# Patient Record
Sex: MALE | URBAN - METROPOLITAN AREA
[De-identification: names, ages, dates, MRNs, and addresses within clinical notes are randomized per-mention and may not be internally consistent; named-entity substitution may affect disease eponyms.]

---

## 2017-04-17 ENCOUNTER — APPOINTMENT (RX ONLY)
Dept: URBAN - METROPOLITAN AREA CLINIC 23 | Facility: CLINIC | Age: 62
Setting detail: DERMATOLOGY
End: 2017-04-17

## 2017-04-17 DIAGNOSIS — D18.0 HEMANGIOMA: ICD-10-CM

## 2017-04-17 DIAGNOSIS — L81.4 OTHER MELANIN HYPERPIGMENTATION: ICD-10-CM

## 2017-04-17 DIAGNOSIS — D22 MELANOCYTIC NEVI: ICD-10-CM

## 2017-04-17 DIAGNOSIS — L82.1 OTHER SEBORRHEIC KERATOSIS: ICD-10-CM

## 2017-04-17 DIAGNOSIS — D485 NEOPLASM OF UNCERTAIN BEHAVIOR OF SKIN: ICD-10-CM

## 2017-04-17 PROBLEM — D48.5 NEOPLASM OF UNCERTAIN BEHAVIOR OF SKIN: Status: ACTIVE | Noted: 2017-04-17

## 2017-04-17 PROBLEM — D22.5 MELANOCYTIC NEVI OF TRUNK: Status: ACTIVE | Noted: 2017-04-17

## 2017-04-17 PROBLEM — D18.01 HEMANGIOMA OF SKIN AND SUBCUTANEOUS TISSUE: Status: ACTIVE | Noted: 2017-04-17

## 2017-04-17 PROBLEM — M12.9 ARTHROPATHY, UNSPECIFIED: Status: ACTIVE | Noted: 2017-04-17

## 2017-04-17 PROBLEM — J30.1 ALLERGIC RHINITIS DUE TO POLLEN: Status: ACTIVE | Noted: 2017-04-17

## 2017-04-17 PROCEDURE — ? OTHER

## 2017-04-17 PROCEDURE — ? COUNSELING

## 2017-04-17 PROCEDURE — 11100: CPT

## 2017-04-17 PROCEDURE — ? BIOPSY BY SHAVE METHOD

## 2017-04-17 PROCEDURE — A4550 SURGICAL TRAYS: HCPCS

## 2017-04-17 PROCEDURE — 99213 OFFICE O/P EST LOW 20 MIN: CPT | Mod: 25

## 2017-04-17 ASSESSMENT — LOCATION SIMPLE DESCRIPTION DERM
LOCATION SIMPLE: CHEST
LOCATION SIMPLE: LEFT UPPER BACK
LOCATION SIMPLE: RIGHT UPPER BACK
LOCATION SIMPLE: LEFT LOWER BACK
LOCATION SIMPLE: ABDOMEN
LOCATION SIMPLE: RIGHT LOWER BACK

## 2017-04-17 ASSESSMENT — LOCATION DETAILED DESCRIPTION DERM
LOCATION DETAILED: RIGHT SUPERIOR MEDIAL UPPER BACK
LOCATION DETAILED: LEFT INFERIOR UPPER BACK
LOCATION DETAILED: RIGHT INFERIOR MEDIAL MIDBACK
LOCATION DETAILED: RIGHT INFERIOR UPPER BACK
LOCATION DETAILED: EPIGASTRIC SKIN
LOCATION DETAILED: RIGHT SUPERIOR MEDIAL MIDBACK
LOCATION DETAILED: LEFT MEDIAL INFERIOR CHEST
LOCATION DETAILED: LEFT SUPERIOR LATERAL MIDBACK
LOCATION DETAILED: LEFT SUPERIOR LATERAL UPPER BACK
LOCATION DETAILED: RIGHT MID-UPPER BACK
LOCATION DETAILED: LEFT SUPERIOR MEDIAL MIDBACK
LOCATION DETAILED: LEFT MID-UPPER BACK
LOCATION DETAILED: LEFT INFERIOR MEDIAL MIDBACK
LOCATION DETAILED: LEFT INFERIOR LATERAL MIDBACK
LOCATION DETAILED: LEFT INFERIOR MEDIAL UPPER BACK

## 2017-04-17 ASSESSMENT — LOCATION ZONE DERM: LOCATION ZONE: TRUNK

## 2017-08-29 PROBLEM — Z01.810 PREOP CARDIOVASCULAR EXAM: Status: ACTIVE | Noted: 2017-08-29

## 2017-10-16 ENCOUNTER — APPOINTMENT (RX ONLY)
Dept: URBAN - METROPOLITAN AREA CLINIC 23 | Facility: CLINIC | Age: 62
Setting detail: DERMATOLOGY
End: 2017-10-16

## 2017-10-16 DIAGNOSIS — L82.0 INFLAMED SEBORRHEIC KERATOSIS: ICD-10-CM

## 2017-10-16 DIAGNOSIS — L82.1 OTHER SEBORRHEIC KERATOSIS: ICD-10-CM

## 2017-10-16 DIAGNOSIS — L85.3 XEROSIS CUTIS: ICD-10-CM

## 2017-10-16 DIAGNOSIS — L81.4 OTHER MELANIN HYPERPIGMENTATION: ICD-10-CM

## 2017-10-16 DIAGNOSIS — D22 MELANOCYTIC NEVI: ICD-10-CM

## 2017-10-16 PROBLEM — L57.0 ACTINIC KERATOSIS: Status: ACTIVE | Noted: 2017-10-16

## 2017-10-16 PROBLEM — D22.5 MELANOCYTIC NEVI OF TRUNK: Status: ACTIVE | Noted: 2017-10-16

## 2017-10-16 PROCEDURE — A4550 SURGICAL TRAYS: HCPCS

## 2017-10-16 PROCEDURE — ? COUNSELING

## 2017-10-16 PROCEDURE — ? LIQUID NITROGEN

## 2017-10-16 PROCEDURE — 17110 DESTRUCTION B9 LES UP TO 14: CPT

## 2017-10-16 PROCEDURE — ? BIOPSY BY SHAVE METHOD

## 2017-10-16 PROCEDURE — 99213 OFFICE O/P EST LOW 20 MIN: CPT | Mod: 25

## 2017-10-16 PROCEDURE — ? OTHER

## 2017-10-16 PROCEDURE — 11100: CPT | Mod: 59

## 2017-10-16 PROCEDURE — ? TREATMENT REGIMEN

## 2017-10-16 ASSESSMENT — LOCATION SIMPLE DESCRIPTION DERM
LOCATION SIMPLE: LEFT ANTERIOR NECK
LOCATION SIMPLE: LEFT PRETIBIAL REGION
LOCATION SIMPLE: RIGHT ANTERIOR NECK
LOCATION SIMPLE: CHEST
LOCATION SIMPLE: LEFT CLAVICULAR SKIN
LOCATION SIMPLE: RIGHT UPPER BACK

## 2017-10-16 ASSESSMENT — LOCATION DETAILED DESCRIPTION DERM
LOCATION DETAILED: LEFT LATERAL SUPERIOR CHEST
LOCATION DETAILED: RIGHT SUPERIOR ANTERIOR NECK
LOCATION DETAILED: LEFT CLAVICULAR SKIN
LOCATION DETAILED: LEFT MEDIAL DISTAL PRETIBIAL REGION
LOCATION DETAILED: LEFT MEDIAL SUPERIOR CHEST
LOCATION DETAILED: RIGHT CLAVICULAR NECK
LOCATION DETAILED: LEFT CLAVICULAR NECK
LOCATION DETAILED: RIGHT SUPERIOR MEDIAL UPPER BACK
LOCATION DETAILED: RIGHT INFERIOR ANTERIOR NECK
LOCATION DETAILED: LEFT INFERIOR ANTERIOR NECK

## 2017-10-16 ASSESSMENT — LOCATION ZONE DERM
LOCATION ZONE: TRUNK
LOCATION ZONE: LEG
LOCATION ZONE: NECK

## 2018-03-16 ENCOUNTER — APPOINTMENT (RX ONLY)
Dept: URBAN - METROPOLITAN AREA CLINIC 24 | Facility: CLINIC | Age: 63
Setting detail: DERMATOLOGY
End: 2018-03-16

## 2018-03-16 DIAGNOSIS — L259 CONTACT DERMATITIS AND OTHER ECZEMA, UNSPECIFIED CAUSE: ICD-10-CM

## 2018-03-16 DIAGNOSIS — R21 RASH AND OTHER NONSPECIFIC SKIN ERUPTION: ICD-10-CM

## 2018-03-16 PROBLEM — L23.9 ALLERGIC CONTACT DERMATITIS, UNSPECIFIED CAUSE: Status: ACTIVE | Noted: 2018-03-16

## 2018-03-16 PROCEDURE — 99213 OFFICE O/P EST LOW 20 MIN: CPT

## 2018-03-16 PROCEDURE — ? TREATMENT REGIMEN

## 2018-03-16 PROCEDURE — ? OTHER

## 2018-03-16 PROCEDURE — ? PRESCRIPTION

## 2018-03-16 PROCEDURE — ? KOH PREP

## 2018-03-16 PROCEDURE — ? COUNSELING

## 2018-03-16 PROCEDURE — 87220 TISSUE EXAM FOR FUNGI: CPT

## 2018-03-16 RX ORDER — PREDNISONE 20 MG/1
TABLET ORAL
Qty: 24 | Refills: 0 | Status: ERX | COMMUNITY
Start: 2018-03-16 | End: 2020-01-27

## 2018-03-16 RX ORDER — TRIAMCINOLONE ACETONIDE 1 MG/G
OINTMENT TOPICAL
Qty: 1 | Refills: 0 | Status: ERX | COMMUNITY
Start: 2018-03-16

## 2018-03-16 RX ADMIN — TRIAMCINOLONE ACETONIDE: 1 OINTMENT TOPICAL at 00:00

## 2018-03-16 RX ADMIN — PREDNISONE: 20 TABLET ORAL at 00:00

## 2018-03-16 ASSESSMENT — LOCATION SIMPLE DESCRIPTION DERM
LOCATION SIMPLE: LEFT HAND
LOCATION SIMPLE: RIGHT CALF
LOCATION SIMPLE: RIGHT HAND
LOCATION SIMPLE: LEFT CALF
LOCATION SIMPLE: LEFT THIGH
LOCATION SIMPLE: ABDOMEN
LOCATION SIMPLE: RIGHT FOREARM
LOCATION SIMPLE: LEFT FOREARM

## 2018-03-16 ASSESSMENT — LOCATION DETAILED DESCRIPTION DERM
LOCATION DETAILED: RIGHT PROXIMAL DORSAL FOREARM
LOCATION DETAILED: LEFT PROXIMAL CALF
LOCATION DETAILED: RIGHT RADIAL DORSAL HAND
LOCATION DETAILED: LEFT PROXIMAL DORSAL FOREARM
LOCATION DETAILED: LEFT POSTERIOR LATERAL PROXIMAL THIGH
LOCATION DETAILED: EPIGASTRIC SKIN
LOCATION DETAILED: LEFT RADIAL DORSAL HAND
LOCATION DETAILED: RIGHT PROXIMAL CALF
LOCATION DETAILED: LEFT ULNAR DORSAL HAND

## 2018-03-16 ASSESSMENT — LOCATION ZONE DERM
LOCATION ZONE: LEG
LOCATION ZONE: ARM
LOCATION ZONE: HAND
LOCATION ZONE: TRUNK

## 2018-04-16 ENCOUNTER — APPOINTMENT (RX ONLY)
Dept: URBAN - METROPOLITAN AREA CLINIC 23 | Facility: CLINIC | Age: 63
Setting detail: DERMATOLOGY
End: 2018-04-16

## 2018-04-16 DIAGNOSIS — R21 RASH AND OTHER NONSPECIFIC SKIN ERUPTION: ICD-10-CM

## 2018-04-16 PROCEDURE — ? COUNSELING

## 2018-04-16 PROCEDURE — ? TREATMENT REGIMEN

## 2018-04-16 PROCEDURE — ? PRESCRIPTION

## 2018-04-16 PROCEDURE — ? BIOPSY BY SHAVE METHOD

## 2018-04-16 PROCEDURE — A4550 SURGICAL TRAYS: HCPCS

## 2018-04-16 PROCEDURE — ? BIOPSY BY PUNCH METHOD

## 2018-04-16 PROCEDURE — 11101: CPT

## 2018-04-16 PROCEDURE — ? OTHER

## 2018-04-16 PROCEDURE — 11100: CPT

## 2018-04-16 RX ORDER — PREDNISONE 20 MG/1
TABLET ORAL
Qty: 29 | Refills: 0 | Status: ERX

## 2018-04-16 RX ORDER — DESOXIMETASONE 2.5 MG/G
CREAM TOPICAL BID
Qty: 1 | Refills: 3 | Status: ERX | COMMUNITY
Start: 2018-04-16

## 2018-04-16 RX ADMIN — DESOXIMETASONE: 2.5 CREAM TOPICAL at 00:00

## 2018-04-16 ASSESSMENT — LOCATION ZONE DERM
LOCATION ZONE: TRUNK
LOCATION ZONE: LEG
LOCATION ZONE: ARM
LOCATION ZONE: NECK
LOCATION ZONE: HAND

## 2018-04-16 ASSESSMENT — LOCATION DETAILED DESCRIPTION DERM
LOCATION DETAILED: RIGHT VENTRAL DISTAL FOREARM
LOCATION DETAILED: RIGHT BUTTOCK
LOCATION DETAILED: LEFT VENTRAL LATERAL DISTAL FOREARM
LOCATION DETAILED: LEFT PROXIMAL POSTERIOR THIGH
LOCATION DETAILED: RIGHT MEDIAL UPPER BACK
LOCATION DETAILED: LEFT ULNAR DORSAL HAND
LOCATION DETAILED: EPIGASTRIC SKIN
LOCATION DETAILED: LEFT PROXIMAL LATERAL POSTERIOR THIGH
LOCATION DETAILED: LEFT INFERIOR ANTERIOR NECK
LOCATION DETAILED: RIGHT ULNAR DORSAL HAND

## 2018-04-16 ASSESSMENT — LOCATION SIMPLE DESCRIPTION DERM
LOCATION SIMPLE: LEFT ANTERIOR NECK
LOCATION SIMPLE: LEFT HAND
LOCATION SIMPLE: RIGHT UPPER BACK
LOCATION SIMPLE: LEFT FOREARM
LOCATION SIMPLE: LEFT POSTERIOR THIGH
LOCATION SIMPLE: RIGHT FOREARM
LOCATION SIMPLE: RIGHT BUTTOCK
LOCATION SIMPLE: RIGHT HAND
LOCATION SIMPLE: ABDOMEN

## 2018-07-16 ENCOUNTER — APPOINTMENT (RX ONLY)
Dept: URBAN - METROPOLITAN AREA CLINIC 23 | Facility: CLINIC | Age: 63
Setting detail: DERMATOLOGY
End: 2018-07-16

## 2018-07-16 DIAGNOSIS — L82.1 OTHER SEBORRHEIC KERATOSIS: ICD-10-CM

## 2018-07-16 DIAGNOSIS — L82.0 INFLAMED SEBORRHEIC KERATOSIS: ICD-10-CM

## 2018-07-16 DIAGNOSIS — L259 CONTACT DERMATITIS AND OTHER ECZEMA, UNSPECIFIED CAUSE: ICD-10-CM

## 2018-07-16 PROBLEM — L23.9 ALLERGIC CONTACT DERMATITIS, UNSPECIFIED CAUSE: Status: ACTIVE | Noted: 2018-07-16

## 2018-07-16 PROCEDURE — 17110 DESTRUCTION B9 LES UP TO 14: CPT

## 2018-07-16 PROCEDURE — ? OTHER

## 2018-07-16 PROCEDURE — 99213 OFFICE O/P EST LOW 20 MIN: CPT | Mod: 25

## 2018-07-16 PROCEDURE — ? LIQUID NITROGEN

## 2018-07-16 PROCEDURE — ? COUNSELING

## 2018-07-16 ASSESSMENT — LOCATION SIMPLE DESCRIPTION DERM
LOCATION SIMPLE: RIGHT ZYGOMA
LOCATION SIMPLE: RIGHT TEMPLE
LOCATION SIMPLE: RIGHT ANTERIOR NECK
LOCATION SIMPLE: LEFT SHOULDER
LOCATION SIMPLE: LEFT HAND
LOCATION SIMPLE: LEFT TEMPLE
LOCATION SIMPLE: RIGHT HAND
LOCATION SIMPLE: UPPER BACK
LOCATION SIMPLE: RIGHT UPPER BACK

## 2018-07-16 ASSESSMENT — LOCATION DETAILED DESCRIPTION DERM
LOCATION DETAILED: RIGHT SUPERIOR ANTERIOR NECK
LOCATION DETAILED: LEFT ANTERIOR SHOULDER
LOCATION DETAILED: RIGHT CENTRAL TEMPLE
LOCATION DETAILED: SUPERIOR THORACIC SPINE
LOCATION DETAILED: RIGHT ULNAR PALM
LOCATION DETAILED: RIGHT CENTRAL ZYGOMA
LOCATION DETAILED: RIGHT RADIAL DORSAL HAND
LOCATION DETAILED: RIGHT CLAVICULAR NECK
LOCATION DETAILED: LEFT RADIAL PALM
LOCATION DETAILED: INFERIOR THORACIC SPINE
LOCATION DETAILED: RIGHT INFERIOR LATERAL NECK
LOCATION DETAILED: RIGHT SUPERIOR MEDIAL UPPER BACK
LOCATION DETAILED: LEFT LATERAL TEMPLE
LOCATION DETAILED: LEFT ULNAR DORSAL HAND

## 2018-07-16 ASSESSMENT — LOCATION ZONE DERM
LOCATION ZONE: ARM
LOCATION ZONE: HAND
LOCATION ZONE: FACE
LOCATION ZONE: NECK
LOCATION ZONE: TRUNK

## 2018-10-15 ENCOUNTER — APPOINTMENT (RX ONLY)
Dept: URBAN - METROPOLITAN AREA CLINIC 23 | Facility: CLINIC | Age: 63
Setting detail: DERMATOLOGY
End: 2018-10-15

## 2018-10-15 DIAGNOSIS — L81.4 OTHER MELANIN HYPERPIGMENTATION: ICD-10-CM

## 2018-10-15 DIAGNOSIS — D22 MELANOCYTIC NEVI: ICD-10-CM

## 2018-10-15 DIAGNOSIS — L85.3 XEROSIS CUTIS: ICD-10-CM

## 2018-10-15 DIAGNOSIS — L20.89 OTHER ATOPIC DERMATITIS: ICD-10-CM

## 2018-10-15 DIAGNOSIS — L82.1 OTHER SEBORRHEIC KERATOSIS: ICD-10-CM

## 2018-10-15 DIAGNOSIS — L259 CONTACT DERMATITIS AND OTHER ECZEMA, UNSPECIFIED CAUSE: ICD-10-CM | Status: RESOLVED

## 2018-10-15 PROBLEM — D22.5 MELANOCYTIC NEVI OF TRUNK: Status: ACTIVE | Noted: 2018-10-15

## 2018-10-15 PROBLEM — J30.1 ALLERGIC RHINITIS DUE TO POLLEN: Status: ACTIVE | Noted: 2018-10-15

## 2018-10-15 PROBLEM — L20.84 INTRINSIC (ALLERGIC) ECZEMA: Status: ACTIVE | Noted: 2018-10-15

## 2018-10-15 PROBLEM — L30.9 DERMATITIS, UNSPECIFIED: Status: ACTIVE | Noted: 2018-10-15

## 2018-10-15 PROBLEM — L57.0 ACTINIC KERATOSIS: Status: ACTIVE | Noted: 2018-10-15

## 2018-10-15 PROCEDURE — ? OTHER

## 2018-10-15 PROCEDURE — ? COUNSELING

## 2018-10-15 PROCEDURE — 99213 OFFICE O/P EST LOW 20 MIN: CPT

## 2018-10-15 PROCEDURE — ? TREATMENT REGIMEN

## 2018-10-15 PROCEDURE — ? OBSERVATION

## 2018-10-15 ASSESSMENT — LOCATION SIMPLE DESCRIPTION DERM
LOCATION SIMPLE: LEFT ANTERIOR NECK
LOCATION SIMPLE: RIGHT FOREARM
LOCATION SIMPLE: RIGHT ANTERIOR NECK
LOCATION SIMPLE: ABDOMEN
LOCATION SIMPLE: LEFT THIGH
LOCATION SIMPLE: RIGHT HAND
LOCATION SIMPLE: LEFT FOREARM
LOCATION SIMPLE: LEFT HAND

## 2018-10-15 ASSESSMENT — LOCATION DETAILED DESCRIPTION DERM
LOCATION DETAILED: RIGHT RADIAL DORSAL HAND
LOCATION DETAILED: LEFT CLAVICULAR NECK
LOCATION DETAILED: LEFT ULNAR DORSAL HAND
LOCATION DETAILED: EPIGASTRIC SKIN
LOCATION DETAILED: LEFT VENTRAL PROXIMAL FOREARM
LOCATION DETAILED: RIGHT CLAVICULAR NECK
LOCATION DETAILED: RIGHT DISTAL DORSAL FOREARM
LOCATION DETAILED: LEFT ANTERIOR DISTAL THIGH

## 2018-10-15 ASSESSMENT — LOCATION ZONE DERM
LOCATION ZONE: HAND
LOCATION ZONE: ARM
LOCATION ZONE: NECK
LOCATION ZONE: TRUNK
LOCATION ZONE: LEG

## 2019-01-07 PROBLEM — E78.2 MIXED HYPERLIPIDEMIA: Status: ACTIVE | Noted: 2019-01-07

## 2019-01-07 PROBLEM — I49.3 PVC (PREMATURE VENTRICULAR CONTRACTION): Status: ACTIVE | Noted: 2019-01-07

## 2019-01-08 ENCOUNTER — HOSPITAL ENCOUNTER (OUTPATIENT)
Dept: CT IMAGING | Age: 64
Discharge: HOME | End: 2019-01-08
Attending: INTERNAL MEDICINE
Payer: SELF-PAY

## 2019-01-08 DIAGNOSIS — R07.89 OTHER CHEST PAIN: ICD-10-CM

## 2019-01-08 PROCEDURE — 75571 CT HRT W/O DYE W/CA TEST: CPT

## 2019-03-09 NOTE — H&P
[address] [place] [address]  HISTORY AND PHYSICAL    Name:  Todd Mccullough  MR#:  [id number]  :  1955  ACCOUNT #:  [de-identified]  ADMIT DATE:  2019    REASON FOR ADMISSION:  Colonoscopy. 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 He was recently found to be anemic, but had normal iron levels and his doctor, Dr. Dayo Fisher, recommends that he undergo a colonoscopy. He had previous colonoscopy in  with Dr. Juan Peters at [facility]. The patient reports that the previous colonoscopy was normal.  The patient denies any melena, bright red blood per rectum, weight loss, loss of appetite, or change in bowel habits. PAST MEDICAL HISTORY:  He had an extensive cardiac workup with Dr. Donna Nunez for PVCs, which was found to be normal.  The patient denies any hypertension; diabetes; coronary artery disease; lung, liver, kidney problems. PAST SURGICAL HISTORY:  Had an inguinal hernia in , forearm fractures repaired in , surgery related to being mauled by a dog in  in his leg, a TURP in , open reduction and internal fixation of right ankle pilon fracture in 10/2014. He subsequently had three other operations for that right ankle fracture. He had a left hip total arthroplasty in 2017. ALLERGIES:  NO KNOWN DRUG ALLERGIES. MEDICATIONS:  None. PHYSICAL EXAMINATION:  GENERAL:  He is a well-developed, well-nourished white male, presently in no apparent distress. LUNGS:  Clear. No rales, rhonchi, or wheezing. HEART:  Sinus rhythm. No murmurs, rubs, or gallops. ABDOMEN:  Soft, nontender, nondistended with active bowel sounds. ASSESSMENT:  A 15-year-old male without a significant past medical history who requests a screening colonoscopy. PLAN:  He will undergo bowel prep on 2019 and undergo an elective colonoscopy on 2019.   I went through risk of bleeding, infection, anesthesia, perforation of the colon. Patient was agreeable, signed the consent form, is scheduled for 03/12/2019.       MD LISANDRA [id number]  D:  03/08/2019 15:00  T:  03/08/2019 20:49  JOB #:  [id number]

## 2019-03-11 ENCOUNTER — ANESTHESIA EVENT (OUTPATIENT)
Dept: ENDOSCOPY | Age: 64
End: 2019-03-11
Payer: COMMERCIAL

## 2019-03-11 RX ORDER — NALOXONE HYDROCHLORIDE 0.4 MG/ML
0.1 INJECTION, SOLUTION INTRAMUSCULAR; INTRAVENOUS; SUBCUTANEOUS AS NEEDED
Status: CANCELLED | OUTPATIENT
Start: 2019-03-11

## 2019-03-11 RX ORDER — FENTANYL CITRATE 50 UG/ML
100 INJECTION, SOLUTION INTRAMUSCULAR; INTRAVENOUS ONCE
Status: CANCELLED | OUTPATIENT
Start: 2019-03-11 | End: 2019-03-11

## 2019-03-11 RX ORDER — OXYCODONE HYDROCHLORIDE 5 MG/1
5 TABLET ORAL
Status: CANCELLED | OUTPATIENT
Start: 2019-03-11 | End: 2019-03-12

## 2019-03-11 RX ORDER — ONDANSETRON 2 MG/ML
4 INJECTION INTRAMUSCULAR; INTRAVENOUS ONCE
Status: CANCELLED | OUTPATIENT
Start: 2019-03-11 | End: 2019-03-11

## 2019-03-11 RX ORDER — MIDAZOLAM HYDROCHLORIDE 1 MG/ML
2 INJECTION, SOLUTION INTRAMUSCULAR; INTRAVENOUS ONCE
Status: CANCELLED | OUTPATIENT
Start: 2019-03-11 | End: 2019-03-11

## 2019-03-11 RX ORDER — OXYCODONE HYDROCHLORIDE 5 MG/1
10 TABLET ORAL
Status: CANCELLED | OUTPATIENT
Start: 2019-03-11 | End: 2019-03-12

## 2019-03-11 RX ORDER — SODIUM CHLORIDE, SODIUM LACTATE, POTASSIUM CHLORIDE, CALCIUM CHLORIDE 600; 310; 30; 20 MG/100ML; MG/100ML; MG/100ML; MG/100ML
100 INJECTION, SOLUTION INTRAVENOUS CONTINUOUS
Status: CANCELLED | OUTPATIENT
Start: 2019-03-11

## 2019-03-11 RX ORDER — ALBUTEROL SULFATE 0.83 MG/ML
2.5 SOLUTION RESPIRATORY (INHALATION) AS NEEDED
Status: CANCELLED | OUTPATIENT
Start: 2019-03-11

## 2019-03-11 RX ORDER — HYDROMORPHONE HYDROCHLORIDE 2 MG/ML
0.5 INJECTION, SOLUTION INTRAMUSCULAR; INTRAVENOUS; SUBCUTANEOUS
Status: CANCELLED | OUTPATIENT
Start: 2019-03-11

## 2019-03-11 RX ORDER — MIDAZOLAM HYDROCHLORIDE 1 MG/ML
2 INJECTION, SOLUTION INTRAMUSCULAR; INTRAVENOUS
Status: CANCELLED | OUTPATIENT
Start: 2019-03-11 | End: 2019-03-12

## 2019-03-11 RX ORDER — LIDOCAINE HYDROCHLORIDE 10 MG/ML
0.1 INJECTION INFILTRATION; PERINEURAL AS NEEDED
Status: CANCELLED | OUTPATIENT
Start: 2019-03-11

## 2019-03-11 RX ORDER — DIPHENHYDRAMINE HYDROCHLORIDE 50 MG/ML
12.5 INJECTION, SOLUTION INTRAMUSCULAR; INTRAVENOUS
Status: CANCELLED | OUTPATIENT
Start: 2019-03-11 | End: 2019-03-12

## 2019-03-12 ENCOUNTER — HOSPITAL ENCOUNTER (OUTPATIENT)
Age: 64
Setting detail: OUTPATIENT SURGERY
Discharge: HOME | End: 2019-03-12
Attending: SURGERY | Admitting: SURGERY
Payer: COMMERCIAL

## 2019-03-12 ENCOUNTER — ANESTHESIA (OUTPATIENT)
Dept: ENDOSCOPY | Age: 64
End: 2019-03-12
Payer: COMMERCIAL

## 2019-03-12 VITALS
RESPIRATION RATE: 16 BRPM | SYSTOLIC BLOOD PRESSURE: 134 MMHG | HEIGHT: 74 IN | TEMPERATURE: 98.6 F | OXYGEN SATURATION: 95 % | HEART RATE: 70 BPM | WEIGHT: 206 LBS | DIASTOLIC BLOOD PRESSURE: 79 MMHG | BODY MASS INDEX: 26.44 KG/M2

## 2019-03-12 PROCEDURE — 88305 TISSUE EXAM BY PATHOLOGIST: CPT

## 2019-03-12 PROCEDURE — 74011250636 HC RX REV CODE- 250/636

## 2019-03-12 PROCEDURE — 74011250636 HC RX REV CODE- 250/636: Performed by: ANESTHESIOLOGY

## 2019-03-12 PROCEDURE — 76040000025: Performed by: SURGERY

## 2019-03-12 PROCEDURE — 77030009426 HC FCPS BIOP ENDOSC BSC -B: Performed by: SURGERY

## 2019-03-12 PROCEDURE — 76060000031 HC ANESTHESIA FIRST 0.5 HR: Performed by: SURGERY

## 2019-03-12 RX ORDER — PROPOFOL 10 MG/ML
INJECTION, EMULSION INTRAVENOUS
Status: DISCONTINUED | OUTPATIENT
Start: 2019-03-12 | End: 2019-03-12 | Stop reason: HOSPADM

## 2019-03-12 RX ORDER — SODIUM CHLORIDE, SODIUM LACTATE, POTASSIUM CHLORIDE, CALCIUM CHLORIDE 600; 310; 30; 20 MG/100ML; MG/100ML; MG/100ML; MG/100ML
100 INJECTION, SOLUTION INTRAVENOUS CONTINUOUS
Status: DISCONTINUED | OUTPATIENT
Start: 2019-03-12 | End: 2019-03-12 | Stop reason: HOSPADM

## 2019-03-12 RX ORDER — PROPOFOL 10 MG/ML
INJECTION, EMULSION INTRAVENOUS AS NEEDED
Status: DISCONTINUED | OUTPATIENT
Start: 2019-03-12 | End: 2019-03-12 | Stop reason: HOSPADM

## 2019-03-12 RX ORDER — LIDOCAINE HYDROCHLORIDE 20 MG/ML
INJECTION, SOLUTION EPIDURAL; INFILTRATION; INTRACAUDAL; PERINEURAL AS NEEDED
Status: DISCONTINUED | OUTPATIENT
Start: 2019-03-12 | End: 2019-03-12 | Stop reason: HOSPADM

## 2019-03-12 RX ADMIN — PROPOFOL 50 MG: 10 INJECTION, EMULSION INTRAVENOUS at 07:00

## 2019-03-12 RX ADMIN — PROPOFOL 200 MCG/KG/MIN: 10 INJECTION, EMULSION INTRAVENOUS at 07:00

## 2019-03-12 RX ADMIN — SODIUM CHLORIDE, SODIUM LACTATE, POTASSIUM CHLORIDE, AND CALCIUM CHLORIDE: 600; 310; 30; 20 INJECTION, SOLUTION INTRAVENOUS at 06:57

## 2019-03-12 RX ADMIN — LIDOCAINE HYDROCHLORIDE 100 MG: 20 INJECTION, SOLUTION EPIDURAL; INFILTRATION; INTRACAUDAL; PERINEURAL at 07:00

## 2019-03-12 NOTE — PROCEDURES
[address] [place] [address]  PROCEDURE NOTE    Name:  Pedro Pablo Coffey  MR#:  [id number]  :  1955  ACCOUNT #:  [de-identified]  DATE OF SERVICE:  2019      PREOPERATIVE DIAGNOSIS:  Anemia, non iron-deficient anemia. POSTOPERATIVE DIAGNOSES:  He had,  1. Good bowel prep. 2.  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 3.  Grade 1 internal hemorrhoids. 4.  No masses, polyps or evidence of bleeding were noted. SURGEON:  Jewels Delaney MD    ASSISTANT:  None. ANESTHESIA:  Monitored anesthesia care with Dr. Dimas Degroot and Abdifatah Hsieh.    ESTIMATED BLOOD LOSS:  None. SPECIMENS REMOVED:  Cecal biopsies x2 were sent to pathology. DRAINS:  None. COMPLICATIONS:  None. IMPLANTS:  None. 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 The patient came, requested a colonoscopy after he was recently found to have non iron-deficient anemia. His primary care physician, Brandie Weller requested the colonoscopy. The patient and I work together in the hospital.  He contacted our office and was given instructions on a bowel prep. The patient underwent the bowel prep on 2019 and came in to the [address] [place] [address] in early morning of 2019 for the procedure. I spoke to the patient in the preop area where he was there by himself. His sister was taking off as his wife was out of town. The patient is familiar with the risks of bleeding, infection, anesthesia, perforation of the colon. He consented to the procedure which was scheduled for this morning. The patient was taken to room #4, [address] [place] [address]. He was placed on the stretcher in the left lateral decubitus position. Time-out was done identifying the patient, surgical procedure and his birth date of 1955. When everyone in the room agreed with the time-out, the monitored anesthesia care was done by Dr. Dimas Degroot and Abdifatah Hsieh.   Once the sedative effect taken hold, an adult colonoscope was advanced through the anus and all the way to the cecum. I was unable to access the terminal ileum as the scope would not advance through the ileocecal valve. The cecum was identified with the ileocecal valve, cecal fold with external compression of the right lower quadrant corresponding to an indentation seen with the scope. In the cecum, there was an erythematous area which appeared abnormal.  It was not appraised at all, so I could not excise the entire area. We did do two cold biopsies with the biopsy forceps. No electrocautery was used. There was minimal bleeding after the biopsies were completed. The scope was slowly withdrawn over a 7-minute period of time. There were no other lesions noted in the cecum, ascending colon, transverse colon, descending colon or sigmoid colon. Specifically, there were no masses, polyps or evidence of diverticular disease noted. There was no evidence of any bleeding throughout the entire procedure other than about 1 mL blood loss from the biopsies done in the cecum. Scope was brought back to the rectum where it was retroflexed. He has some grade 1 internal hemorrhoids, but no other lesions. A digital rectal exam was done after the scope was removed. He had a slightly enlarged prostate. No nodules were palpated and good sphincter tone. The patient tolerated the procedure well. He was taken to recovery room in stable condition. Unfortunately, there was no one to speak with after the procedure. I gave Dr. Caldwell Ashton my cell phone number and he will call me to go over the results later today. We will await the biopsy results from the cecum.       MD LISANDRA [id number]  D:  03/12/2019 8:35  T:  03/12/2019 17:13  JOB #:  [id number]

## 2019-03-12 NOTE — INTERVAL H&P NOTE
H&P Update:  Nadia Rios MD was seen and examined. History and physical has been reviewed. The patient has been examined.  There have been no significant clinical changes since the completion of the originally dated History and Physical.    Signed By: Joseph Spencer MD     March 12, 2019 6:49 AM

## 2019-03-12 NOTE — ANESTHESIA POSTPROCEDURE EVALUATION
Procedure(s):  COLONOSCOPY BMI 27  COLON BIOPSY.     Anesthesia Post Evaluation      Multimodal analgesia: multimodal analgesia used between 6 hours prior to anesthesia start to PACU discharge  Patient location during evaluation: PACU  Patient participation: complete - patient participated  Level of consciousness: awake and awake and alert  Pain management: adequate  Airway patency: patent  Anesthetic complications: no  Cardiovascular status: acceptable  Respiratory status: acceptable  Hydration status: acceptable  Post anesthesia nausea and vomiting:  controlled      Visit Vitals  /58   Pulse 69   Temp 37 °C (98.6 °F)   Resp 16   Ht 6' 2\" (1.88 m)   Wt 93.4 kg (206 lb)   SpO2 94%   BMI 26.45 kg/m²

## 2019-03-12 NOTE — ANESTHESIA PREPROCEDURE EVALUATION
Anesthetic History               Review of Systems / Medical History  Patient summary reviewed, nursing notes reviewed and pertinent labs reviewed    Pulmonary                   Neuro/Psych              Cardiovascular            Dysrhythmias : PVC      Exercise tolerance: >4 METS     GI/Hepatic/Renal                Endo/Other             Other Findings              Physical Exam    Airway  Mallampati: II  TM Distance: 4 - 6 cm  Neck ROM: normal range of motion   Mouth opening: Normal     Cardiovascular  Regular rate and rhythm,  S1 and S2 normal,  no murmur, click, rub, or gallop             Dental  No notable dental hx       Pulmonary  Breath sounds clear to auscultation               Abdominal         Other Findings            Anesthetic Plan    ASA: 2  Anesthesia type: total IV anesthesia          Induction: Intravenous  Anesthetic plan and risks discussed with: Patient

## 2019-03-12 NOTE — DISCHARGE INSTRUCTIONS
Gastrointestinal Colonoscopy/Flexible Sigmoidoscopy - Lower Exam Discharge Instructions  1. Call Dr. Héctor Dick at [phone] for any problems or questions. 2. Contact the doctors office for follow up appointment as directed  3. Medication may cause drowsiness for several hours, therefore, do not drive or operate machinery for remainder of the day. 4. No alcohol today. 5. Ordinarily, you may resume regular diet and activity after exam unless otherwise specified by your physician. 6. Because of air put into your colon during exam, you may experience some abdominal distension, relieved by the passage of gas, for several hours. 7. Contact your physician if you have any of the following:  a. Excessive amount of bleeding - large amount when having a bowel movement. Occasional specks of blood with bowel movement would not be unusual.  b. Severe abdominal pain  c. Fever or Chills  Any additional instructions:   Follow-up with Dr. Héctor Dikc for results

## 2019-03-12 NOTE — PROGRESS NOTES
's pre-procedure visit and prayer with patient as requested.     Kulwinder Sahni MDiv, BS  Board Certified

## 2019-03-12 NOTE — ROUTINE PROCESS
VSS. No complaints noted. Education reviewed and signed with patient. Pt discharged by Cinthya Cruz RN.

## 2019-05-06 ENCOUNTER — APPOINTMENT (RX ONLY)
Dept: URBAN - METROPOLITAN AREA CLINIC 23 | Facility: CLINIC | Age: 64
Setting detail: DERMATOLOGY
End: 2019-05-06

## 2019-05-06 DIAGNOSIS — D22 MELANOCYTIC NEVI: ICD-10-CM

## 2019-05-06 DIAGNOSIS — L82.1 OTHER SEBORRHEIC KERATOSIS: ICD-10-CM

## 2019-05-06 PROBLEM — D22.5 MELANOCYTIC NEVI OF TRUNK: Status: ACTIVE | Noted: 2019-05-06

## 2019-05-06 PROCEDURE — 99214 OFFICE O/P EST MOD 30 MIN: CPT

## 2019-05-06 PROCEDURE — ? OTHER

## 2019-05-06 PROCEDURE — ? COUNSELING

## 2019-05-06 ASSESSMENT — LOCATION DETAILED DESCRIPTION DERM
LOCATION DETAILED: LEFT SUPERIOR LATERAL MALAR CHEEK
LOCATION DETAILED: RIGHT LATERAL FOREHEAD
LOCATION DETAILED: RIGHT ANTERIOR DISTAL THIGH
LOCATION DETAILED: EPIGASTRIC SKIN
LOCATION DETAILED: RIGHT DISTAL RADIAL DORSAL FOREARM
LOCATION DETAILED: 2ND WEB SPACE RIGHT HAND
LOCATION DETAILED: RIGHT LATERAL INFERIOR CHEST
LOCATION DETAILED: RIGHT SUPERIOR LATERAL FOREHEAD
LOCATION DETAILED: LEFT ULNAR DORSAL HAND
LOCATION DETAILED: LEFT SUPERIOR PREAURICULAR CHEEK
LOCATION DETAILED: LEFT VENTRAL DISTAL FOREARM
LOCATION DETAILED: LEFT RIB CAGE
LOCATION DETAILED: RIGHT DISTAL CALF
LOCATION DETAILED: RIGHT AXILLARY VAULT
LOCATION DETAILED: LEFT MEDIAL INFERIOR CHEST
LOCATION DETAILED: RIGHT SUPERIOR MEDIAL MIDBACK
LOCATION DETAILED: RIGHT VENTRAL DISTAL FOREARM
LOCATION DETAILED: LEFT DISTAL DORSAL FOREARM
LOCATION DETAILED: LEFT DISTAL PRETIBIAL REGION
LOCATION DETAILED: LEFT POSTERIOR ANKLE
LOCATION DETAILED: LEFT ANTERIOR DISTAL THIGH
LOCATION DETAILED: RIGHT DISTAL PRETIBIAL REGION

## 2019-05-06 ASSESSMENT — LOCATION SIMPLE DESCRIPTION DERM
LOCATION SIMPLE: LEFT FOREARM
LOCATION SIMPLE: RIGHT PRETIBIAL REGION
LOCATION SIMPLE: RIGHT LOWER BACK
LOCATION SIMPLE: RIGHT FOREHEAD
LOCATION SIMPLE: RIGHT FOREARM
LOCATION SIMPLE: RIGHT CALF
LOCATION SIMPLE: RIGHT THIGH
LOCATION SIMPLE: LEFT CHEEK
LOCATION SIMPLE: LEFT ANKLE
LOCATION SIMPLE: RIGHT AXILLARY VAULT
LOCATION SIMPLE: RIGHT HAND
LOCATION SIMPLE: LEFT PRETIBIAL REGION
LOCATION SIMPLE: ABDOMEN
LOCATION SIMPLE: LEFT THIGH
LOCATION SIMPLE: CHEST
LOCATION SIMPLE: LEFT HAND

## 2019-05-06 ASSESSMENT — LOCATION ZONE DERM
LOCATION ZONE: ARM
LOCATION ZONE: AXILLAE
LOCATION ZONE: FACE
LOCATION ZONE: TRUNK
LOCATION ZONE: LEG
LOCATION ZONE: HAND

## 2019-10-30 ENCOUNTER — RX ONLY (OUTPATIENT)
Age: 64
Setting detail: RX ONLY
End: 2019-10-30

## 2019-10-30 RX ORDER — DESOXIMETASONE 2.5 MG/G
CREAM TOPICAL BID
Qty: 1 | Refills: 3 | Status: ERX

## 2020-01-27 ENCOUNTER — APPOINTMENT (RX ONLY)
Dept: URBAN - METROPOLITAN AREA CLINIC 23 | Facility: CLINIC | Age: 65
Setting detail: DERMATOLOGY
End: 2020-01-27

## 2020-01-27 DIAGNOSIS — L82.1 OTHER SEBORRHEIC KERATOSIS: ICD-10-CM

## 2020-01-27 DIAGNOSIS — L21.8 OTHER SEBORRHEIC DERMATITIS: ICD-10-CM

## 2020-01-27 DIAGNOSIS — L259 CONTACT DERMATITIS AND OTHER ECZEMA, UNSPECIFIED CAUSE: ICD-10-CM | Status: WELL CONTROLLED

## 2020-01-27 DIAGNOSIS — D22 MELANOCYTIC NEVI: ICD-10-CM

## 2020-01-27 DIAGNOSIS — L20.89 OTHER ATOPIC DERMATITIS: ICD-10-CM

## 2020-01-27 PROBLEM — D22.5 MELANOCYTIC NEVI OF TRUNK: Status: ACTIVE | Noted: 2020-01-27

## 2020-01-27 PROBLEM — L20.84 INTRINSIC (ALLERGIC) ECZEMA: Status: ACTIVE | Noted: 2020-01-27

## 2020-01-27 PROBLEM — L23.9 ALLERGIC CONTACT DERMATITIS, UNSPECIFIED CAUSE: Status: ACTIVE | Noted: 2020-01-27

## 2020-01-27 PROBLEM — L70.0 ACNE VULGARIS: Status: ACTIVE | Noted: 2020-01-27

## 2020-01-27 PROCEDURE — 99214 OFFICE O/P EST MOD 30 MIN: CPT

## 2020-01-27 PROCEDURE — ? PRESCRIPTION

## 2020-01-27 PROCEDURE — ? COUNSELING

## 2020-01-27 PROCEDURE — ? TREATMENT REGIMEN

## 2020-01-27 PROCEDURE — ? OTHER

## 2020-01-27 RX ORDER — CICLOPIROX 10 MG/.96ML
SHAMPOO TOPICAL
Qty: 1 | Refills: 4 | COMMUNITY
Start: 2020-01-27

## 2020-01-27 RX ORDER — DESOXIMETASONE 2.5 MG/G
CREAM TOPICAL
Qty: 1 | Refills: 4 | COMMUNITY
Start: 2020-01-27

## 2020-01-27 RX ADMIN — DESOXIMETASONE: 2.5 CREAM TOPICAL at 00:00

## 2020-01-27 RX ADMIN — CICLOPIROX: 10 SHAMPOO TOPICAL at 00:00

## 2020-01-27 ASSESSMENT — LOCATION DETAILED DESCRIPTION DERM
LOCATION DETAILED: EPIGASTRIC SKIN
LOCATION DETAILED: RIGHT ANTERIOR PROXIMAL THIGH
LOCATION DETAILED: LEFT SUPERIOR LATERAL UPPER BACK
LOCATION DETAILED: LEFT RADIAL DORSAL HAND
LOCATION DETAILED: RIGHT DISTAL DORSAL FOREARM
LOCATION DETAILED: RIGHT RADIAL DORSAL HAND
LOCATION DETAILED: RIGHT LATERAL UPPER BACK
LOCATION DETAILED: LEFT SUPERIOR UPPER BACK
LOCATION DETAILED: RIGHT SUPERIOR LATERAL MIDBACK
LOCATION DETAILED: LEFT ANTERIOR DISTAL THIGH
LOCATION DETAILED: LEFT LATERAL UPPER BACK
LOCATION DETAILED: RIGHT SUPERIOR UPPER BACK
LOCATION DETAILED: RIGHT POSTERIOR AXILLA
LOCATION DETAILED: RIGHT SUPERIOR MEDIAL UPPER BACK
LOCATION DETAILED: LEFT MID-UPPER BACK
LOCATION DETAILED: RIGHT AXILLARY VAULT
LOCATION DETAILED: LEFT ULNAR DORSAL HAND
LOCATION DETAILED: RIGHT MID-UPPER BACK
LOCATION DETAILED: LEFT SUPERIOR PARIETAL SCALP
LOCATION DETAILED: LEFT RADIAL PALM
LOCATION DETAILED: SUPERIOR THORACIC SPINE
LOCATION DETAILED: LEFT PROXIMAL DORSAL FOREARM
LOCATION DETAILED: RIGHT INFERIOR LATERAL UPPER BACK
LOCATION DETAILED: LEFT MEDIAL UPPER BACK
LOCATION DETAILED: RIGHT LATERAL INFERIOR CHEST
LOCATION DETAILED: LEFT SUPERIOR MEDIAL UPPER BACK
LOCATION DETAILED: INFERIOR THORACIC SPINE
LOCATION DETAILED: RIGHT LATERAL ABDOMEN
LOCATION DETAILED: LEFT INFERIOR UPPER BACK
LOCATION DETAILED: RIGHT ULNAR PALM

## 2020-01-27 ASSESSMENT — LOCATION SIMPLE DESCRIPTION DERM
LOCATION SIMPLE: RIGHT AXILLARY VAULT
LOCATION SIMPLE: ABDOMEN
LOCATION SIMPLE: CHEST
LOCATION SIMPLE: LEFT HAND
LOCATION SIMPLE: RIGHT LOWER BACK
LOCATION SIMPLE: RIGHT THIGH
LOCATION SIMPLE: RIGHT FOREARM
LOCATION SIMPLE: RIGHT HAND
LOCATION SIMPLE: LEFT UPPER BACK
LOCATION SIMPLE: UPPER BACK
LOCATION SIMPLE: LEFT THIGH
LOCATION SIMPLE: RIGHT POSTERIOR AXILLA
LOCATION SIMPLE: RIGHT UPPER BACK
LOCATION SIMPLE: LEFT FOREARM
LOCATION SIMPLE: SCALP

## 2020-01-27 ASSESSMENT — LOCATION ZONE DERM
LOCATION ZONE: SCALP
LOCATION ZONE: TRUNK
LOCATION ZONE: ARM
LOCATION ZONE: HAND
LOCATION ZONE: AXILLAE
LOCATION ZONE: LEG

## 2020-08-12 ENCOUNTER — APPOINTMENT (RX ONLY)
Dept: URBAN - METROPOLITAN AREA CLINIC 23 | Facility: CLINIC | Age: 65
Setting detail: DERMATOLOGY
End: 2020-08-12

## 2020-08-12 DIAGNOSIS — L82.1 OTHER SEBORRHEIC KERATOSIS: ICD-10-CM

## 2020-08-12 DIAGNOSIS — D22 MELANOCYTIC NEVI: ICD-10-CM

## 2020-08-12 DIAGNOSIS — L21.8 OTHER SEBORRHEIC DERMATITIS: ICD-10-CM

## 2020-08-12 DIAGNOSIS — L259 CONTACT DERMATITIS AND OTHER ECZEMA, UNSPECIFIED CAUSE: ICD-10-CM | Status: STABLE

## 2020-08-12 DIAGNOSIS — L81.4 OTHER MELANIN HYPERPIGMENTATION: ICD-10-CM

## 2020-08-12 PROBLEM — M12.9 ARTHROPATHY, UNSPECIFIED: Status: ACTIVE | Noted: 2020-08-12

## 2020-08-12 PROBLEM — L70.0 ACNE VULGARIS: Status: ACTIVE | Noted: 2020-08-12

## 2020-08-12 PROBLEM — L23.9 ALLERGIC CONTACT DERMATITIS, UNSPECIFIED CAUSE: Status: ACTIVE | Noted: 2020-08-12

## 2020-08-12 PROBLEM — D22.5 MELANOCYTIC NEVI OF TRUNK: Status: ACTIVE | Noted: 2020-08-12

## 2020-08-12 PROCEDURE — ? OTHER

## 2020-08-12 PROCEDURE — 99214 OFFICE O/P EST MOD 30 MIN: CPT

## 2020-08-12 PROCEDURE — ? COUNSELING

## 2020-08-12 PROCEDURE — ? TREATMENT REGIMEN

## 2020-08-12 PROCEDURE — ? PRESCRIPTION

## 2020-08-12 RX ORDER — CICLOPIROX 10 MG/.96ML
SHAMPOO TOPICAL
Qty: 1 | Refills: 4 | Status: ERX

## 2020-08-12 ASSESSMENT — LOCATION SIMPLE DESCRIPTION DERM
LOCATION SIMPLE: RIGHT HAND
LOCATION SIMPLE: CHEST
LOCATION SIMPLE: RIGHT CHEEK
LOCATION SIMPLE: LEFT HAND
LOCATION SIMPLE: RIGHT FOREHEAD
LOCATION SIMPLE: SCALP
LOCATION SIMPLE: RIGHT ZYGOMA
LOCATION SIMPLE: RIGHT LOWER BACK
LOCATION SIMPLE: RIGHT SHOULDER
LOCATION SIMPLE: LEFT SHOULDER
LOCATION SIMPLE: LEFT UPPER BACK
LOCATION SIMPLE: RIGHT UPPER BACK

## 2020-08-12 ASSESSMENT — LOCATION DETAILED DESCRIPTION DERM
LOCATION DETAILED: LEFT LATERAL UPPER BACK
LOCATION DETAILED: RIGHT MEDIAL ZYGOMA
LOCATION DETAILED: RIGHT ULNAR PALM
LOCATION DETAILED: STERNUM
LOCATION DETAILED: RIGHT POSTERIOR SHOULDER
LOCATION DETAILED: RIGHT LATERAL UPPER BACK
LOCATION DETAILED: LEFT ULNAR DORSAL HAND
LOCATION DETAILED: LEFT RADIAL PALM
LOCATION DETAILED: RIGHT SUPERIOR MEDIAL MIDBACK
LOCATION DETAILED: LEFT INFERIOR MEDIAL UPPER BACK
LOCATION DETAILED: RIGHT MID-UPPER BACK
LOCATION DETAILED: LEFT POSTERIOR SHOULDER
LOCATION DETAILED: LEFT SUPERIOR PARIETAL SCALP
LOCATION DETAILED: RIGHT SUPERIOR PARIETAL SCALP
LOCATION DETAILED: RIGHT FOREHEAD
LOCATION DETAILED: RIGHT INFERIOR LATERAL UPPER BACK
LOCATION DETAILED: RIGHT SUPERIOR FOREHEAD
LOCATION DETAILED: RIGHT INFERIOR LATERAL MALAR CHEEK

## 2020-08-12 ASSESSMENT — LOCATION ZONE DERM
LOCATION ZONE: FACE
LOCATION ZONE: ARM
LOCATION ZONE: TRUNK
LOCATION ZONE: SCALP
LOCATION ZONE: HAND

## 2021-03-24 ENCOUNTER — APPOINTMENT (RX ONLY)
Dept: URBAN - METROPOLITAN AREA CLINIC 23 | Facility: CLINIC | Age: 66
Setting detail: DERMATOLOGY
End: 2021-03-24

## 2021-03-24 DIAGNOSIS — L20.89 OTHER ATOPIC DERMATITIS: ICD-10-CM

## 2021-03-24 DIAGNOSIS — L81.4 OTHER MELANIN HYPERPIGMENTATION: ICD-10-CM

## 2021-03-24 DIAGNOSIS — D22 MELANOCYTIC NEVI: ICD-10-CM

## 2021-03-24 DIAGNOSIS — B35.1 TINEA UNGUIUM: ICD-10-CM

## 2021-03-24 DIAGNOSIS — L82.1 OTHER SEBORRHEIC KERATOSIS: ICD-10-CM

## 2021-03-24 DIAGNOSIS — L259 CONTACT DERMATITIS AND OTHER ECZEMA, UNSPECIFIED CAUSE: ICD-10-CM

## 2021-03-24 PROBLEM — D48.5 NEOPLASM OF UNCERTAIN BEHAVIOR OF SKIN: Status: ACTIVE | Noted: 2021-03-24

## 2021-03-24 PROBLEM — D22.5 MELANOCYTIC NEVI OF TRUNK: Status: ACTIVE | Noted: 2021-03-24

## 2021-03-24 PROBLEM — L20.84 INTRINSIC (ALLERGIC) ECZEMA: Status: ACTIVE | Noted: 2021-03-24

## 2021-03-24 PROBLEM — L23.9 ALLERGIC CONTACT DERMATITIS, UNSPECIFIED CAUSE: Status: ACTIVE | Noted: 2021-03-24

## 2021-03-24 PROCEDURE — 99213 OFFICE O/P EST LOW 20 MIN: CPT | Mod: 25

## 2021-03-24 PROCEDURE — ? TREATMENT REGIMEN

## 2021-03-24 PROCEDURE — ? COUNSELING

## 2021-03-24 PROCEDURE — ? OTHER

## 2021-03-24 PROCEDURE — 11301 SHAVE SKIN LESION 0.6-1.0 CM: CPT

## 2021-03-24 PROCEDURE — ? SHAVE REMOVAL

## 2021-03-24 ASSESSMENT — LOCATION DETAILED DESCRIPTION DERM
LOCATION DETAILED: LEFT CENTRAL FRONTAL SCALP
LOCATION DETAILED: STERNUM
LOCATION DETAILED: LEFT ULNAR DORSAL HAND
LOCATION DETAILED: RIGHT SUPERIOR CENTRAL MALAR CHEEK
LOCATION DETAILED: SUPERIOR THORACIC SPINE
LOCATION DETAILED: LEFT POSTERIOR SHOULDER
LOCATION DETAILED: RIGHT MID-UPPER BACK
LOCATION DETAILED: RIGHT GREAT TOENAIL
LOCATION DETAILED: RIGHT FOREHEAD
LOCATION DETAILED: RIGHT SUPERIOR UPPER BACK
LOCATION DETAILED: RIGHT INFERIOR FOREHEAD
LOCATION DETAILED: LEFT PROXIMAL POSTERIOR UPPER ARM
LOCATION DETAILED: RIGHT RADIAL PALM
LOCATION DETAILED: RIGHT RADIAL DORSAL HAND
LOCATION DETAILED: LEFT LATERAL FOREHEAD
LOCATION DETAILED: LEFT SUPERIOR HELIX
LOCATION DETAILED: LEFT THENAR EMINENCE
LOCATION DETAILED: LEFT DISTAL DORSAL INDEX FINGER
LOCATION DETAILED: LEFT INFERIOR LATERAL FOREHEAD
LOCATION DETAILED: LEFT GREAT TOENAIL
LOCATION DETAILED: RIGHT CENTRAL TEMPLE
LOCATION DETAILED: LEFT SUPERIOR FOREHEAD

## 2021-03-24 ASSESSMENT — LOCATION SIMPLE DESCRIPTION DERM
LOCATION SIMPLE: LEFT EAR
LOCATION SIMPLE: RIGHT GREAT TOE
LOCATION SIMPLE: LEFT INDEX FINGER
LOCATION SIMPLE: CHEST
LOCATION SIMPLE: RIGHT TEMPLE
LOCATION SIMPLE: LEFT FOREHEAD
LOCATION SIMPLE: LEFT GREAT TOE
LOCATION SIMPLE: RIGHT HAND
LOCATION SIMPLE: UPPER BACK
LOCATION SIMPLE: RIGHT UPPER BACK
LOCATION SIMPLE: LEFT HAND
LOCATION SIMPLE: RIGHT FOREHEAD
LOCATION SIMPLE: RIGHT CHEEK
LOCATION SIMPLE: LEFT SCALP
LOCATION SIMPLE: LEFT SHOULDER
LOCATION SIMPLE: LEFT UPPER ARM

## 2021-03-24 ASSESSMENT — LOCATION ZONE DERM
LOCATION ZONE: EAR
LOCATION ZONE: ARM
LOCATION ZONE: SCALP
LOCATION ZONE: HAND
LOCATION ZONE: FACE
LOCATION ZONE: TRUNK
LOCATION ZONE: TOENAIL
LOCATION ZONE: FINGER

## 2021-05-12 ENCOUNTER — APPOINTMENT (RX ONLY)
Dept: URBAN - METROPOLITAN AREA CLINIC 23 | Facility: CLINIC | Age: 66
Setting detail: DERMATOLOGY
End: 2021-05-12

## 2021-05-12 DIAGNOSIS — D22 MELANOCYTIC NEVI: ICD-10-CM

## 2021-05-12 PROBLEM — D48.5 NEOPLASM OF UNCERTAIN BEHAVIOR OF SKIN: Status: ACTIVE | Noted: 2021-05-12

## 2021-05-12 PROCEDURE — 12032 INTMD RPR S/A/T/EXT 2.6-7.5: CPT

## 2021-05-12 PROCEDURE — ? EXCISION

## 2021-05-12 PROCEDURE — 11402 EXC TR-EXT B9+MARG 1.1-2 CM: CPT

## 2021-05-12 ASSESSMENT — LOCATION DETAILED DESCRIPTION DERM: LOCATION DETAILED: LEFT PROXIMAL POSTERIOR UPPER ARM

## 2021-05-12 ASSESSMENT — LOCATION ZONE DERM: LOCATION ZONE: ARM

## 2021-05-12 ASSESSMENT — LOCATION SIMPLE DESCRIPTION DERM: LOCATION SIMPLE: LEFT UPPER ARM

## 2022-03-19 PROBLEM — I49.3 PVC (PREMATURE VENTRICULAR CONTRACTION): Status: ACTIVE | Noted: 2019-01-07

## 2022-03-19 PROBLEM — E78.2 MIXED HYPERLIPIDEMIA: Status: ACTIVE | Noted: 2019-01-07

## 2022-03-19 PROBLEM — Z01.810 PREOP CARDIOVASCULAR EXAM: Status: ACTIVE | Noted: 2017-08-29

## 2022-07-06 ENCOUNTER — APPOINTMENT (RX ONLY)
Dept: URBAN - METROPOLITAN AREA CLINIC 23 | Facility: CLINIC | Age: 67
Setting detail: DERMATOLOGY
End: 2022-07-06

## 2022-07-06 DIAGNOSIS — D22 MELANOCYTIC NEVI: ICD-10-CM

## 2022-07-06 DIAGNOSIS — L20.89 OTHER ATOPIC DERMATITIS: ICD-10-CM | Status: WELL CONTROLLED

## 2022-07-06 DIAGNOSIS — Z87.2 PERSONAL HISTORY OF DISEASES OF THE SKIN AND SUBCUTANEOUS TISSUE: ICD-10-CM

## 2022-07-06 DIAGNOSIS — L21.8 OTHER SEBORRHEIC DERMATITIS: ICD-10-CM | Status: WELL CONTROLLED

## 2022-07-06 DIAGNOSIS — L81.4 OTHER MELANIN HYPERPIGMENTATION: ICD-10-CM

## 2022-07-06 DIAGNOSIS — L82.1 OTHER SEBORRHEIC KERATOSIS: ICD-10-CM

## 2022-07-06 PROBLEM — J30.1 ALLERGIC RHINITIS DUE TO POLLEN: Status: ACTIVE | Noted: 2022-07-06

## 2022-07-06 PROBLEM — D22.61 MELANOCYTIC NEVI OF RIGHT UPPER LIMB, INCLUDING SHOULDER: Status: ACTIVE | Noted: 2022-07-06

## 2022-07-06 PROBLEM — D22.5 MELANOCYTIC NEVI OF TRUNK: Status: ACTIVE | Noted: 2022-07-06

## 2022-07-06 PROBLEM — L20.84 INTRINSIC (ALLERGIC) ECZEMA: Status: ACTIVE | Noted: 2022-07-06

## 2022-07-06 PROBLEM — D22.62 MELANOCYTIC NEVI OF LEFT UPPER LIMB, INCLUDING SHOULDER: Status: ACTIVE | Noted: 2022-07-06

## 2022-07-06 PROBLEM — L70.0 ACNE VULGARIS: Status: ACTIVE | Noted: 2022-07-06

## 2022-07-06 PROCEDURE — ? PHOTO-DOCUMENTATION

## 2022-07-06 PROCEDURE — ? COUNSELING

## 2022-07-06 PROCEDURE — 99213 OFFICE O/P EST LOW 20 MIN: CPT

## 2022-07-06 PROCEDURE — ? TREATMENT REGIMEN

## 2022-07-06 PROCEDURE — ? OTHER

## 2022-07-06 ASSESSMENT — LOCATION DETAILED DESCRIPTION DERM
LOCATION DETAILED: RIGHT DISTAL DORSAL FOREARM
LOCATION DETAILED: RIGHT INFERIOR MEDIAL LOWER BACK
LOCATION DETAILED: EPIGASTRIC SKIN
LOCATION DETAILED: LEFT INFERIOR MEDIAL UPPER BACK
LOCATION DETAILED: RIGHT SUPERIOR PARIETAL SCALP
LOCATION DETAILED: SUPERIOR THORACIC SPINE
LOCATION DETAILED: SUPERIOR LUMBAR SPINE
LOCATION DETAILED: LEFT ULNAR DORSAL HAND
LOCATION DETAILED: LEFT DISTAL ULNAR DORSAL FOREARM
LOCATION DETAILED: LEFT VENTRAL PROXIMAL FOREARM
LOCATION DETAILED: LEFT DISTAL DORSAL FOREARM
LOCATION DETAILED: RIGHT LATERAL UPPER BACK
LOCATION DETAILED: STERNUM
LOCATION DETAILED: LEFT INFERIOR MEDIAL LOWER BACK
LOCATION DETAILED: RIGHT LATERAL TRAPEZIAL NECK
LOCATION DETAILED: LEFT POSTERIOR SHOULDER
LOCATION DETAILED: RIGHT POSTERIOR SHOULDER
LOCATION DETAILED: RIGHT MEDIAL UPPER BACK
LOCATION DETAILED: RIGHT RADIAL DORSAL HAND
LOCATION DETAILED: LEFT PROXIMAL POSTERIOR UPPER ARM
LOCATION DETAILED: MID POSTERIOR NECK
LOCATION DETAILED: RIGHT SUPERIOR MEDIAL UPPER BACK
LOCATION DETAILED: RIGHT MID-UPPER BACK
LOCATION DETAILED: LEFT INFERIOR CENTRAL MALAR CHEEK
LOCATION DETAILED: LEFT SUPERIOR MEDIAL LOWER BACK
LOCATION DETAILED: RIGHT SUPERIOR MEDIAL MIDBACK
LOCATION DETAILED: RIGHT INFERIOR MEDIAL MIDBACK
LOCATION DETAILED: LEFT INFERIOR MEDIAL FOREHEAD
LOCATION DETAILED: RIGHT SUPERIOR LATERAL LOWER BACK
LOCATION DETAILED: RIGHT MEDIAL TRAPEZIAL NECK
LOCATION DETAILED: RIGHT SUPERIOR MEDIAL LOWER BACK
LOCATION DETAILED: RIGHT MEDIAL INFERIOR CHEST
LOCATION DETAILED: INFERIOR LUMBAR SPINE
LOCATION DETAILED: RIGHT SUPERIOR UPPER BACK
LOCATION DETAILED: RIGHT INFERIOR CENTRAL MALAR CHEEK
LOCATION DETAILED: RIGHT MEDIAL SUPERIOR CHEST
LOCATION DETAILED: RIGHT PROXIMAL DORSAL FOREARM
LOCATION DETAILED: RIGHT AXILLARY VAULT

## 2022-07-06 ASSESSMENT — LOCATION SIMPLE DESCRIPTION DERM
LOCATION SIMPLE: RIGHT HAND
LOCATION SIMPLE: LEFT UPPER ARM
LOCATION SIMPLE: LOWER BACK
LOCATION SIMPLE: LEFT LOWER BACK
LOCATION SIMPLE: SCALP
LOCATION SIMPLE: RIGHT AXILLARY VAULT
LOCATION SIMPLE: RIGHT UPPER BACK
LOCATION SIMPLE: RIGHT SHOULDER
LOCATION SIMPLE: LEFT HAND
LOCATION SIMPLE: LEFT FOREARM
LOCATION SIMPLE: RIGHT CHEEK
LOCATION SIMPLE: RIGHT FOREARM
LOCATION SIMPLE: LEFT CHEEK
LOCATION SIMPLE: POSTERIOR NECK
LOCATION SIMPLE: LEFT SHOULDER
LOCATION SIMPLE: RIGHT LOWER BACK
LOCATION SIMPLE: ABDOMEN
LOCATION SIMPLE: LEFT UPPER BACK
LOCATION SIMPLE: CHEST
LOCATION SIMPLE: UPPER BACK
LOCATION SIMPLE: LEFT FOREHEAD

## 2022-07-06 ASSESSMENT — LOCATION ZONE DERM
LOCATION ZONE: AXILLAE
LOCATION ZONE: FACE
LOCATION ZONE: TRUNK
LOCATION ZONE: NECK
LOCATION ZONE: HAND
LOCATION ZONE: ARM
LOCATION ZONE: SCALP

## 2022-07-06 ASSESSMENT — SEVERITY ASSESSMENT: HOW SEVERE IS THIS PATIENT'S CONDITION?: CLEAR

## 2022-07-06 ASSESSMENT — SEVERITY ASSESSMENT 2020: SEVERITY 2020: MILD

## 2022-12-14 ENCOUNTER — OFFICE VISIT (OUTPATIENT)
Dept: UROLOGY | Age: 67
End: 2022-12-14
Payer: MEDICARE

## 2022-12-14 DIAGNOSIS — N39.0 RECURRENT UTI: Primary | ICD-10-CM

## 2022-12-14 LAB
BILIRUBIN, URINE, POC: NEGATIVE
BLOOD URINE, POC: NEGATIVE
GLUCOSE URINE, POC: NEGATIVE
KETONES, URINE, POC: NEGATIVE
LEUKOCYTE ESTERASE, URINE, POC: NEGATIVE
NITRITE, URINE, POC: NEGATIVE
PH, URINE, POC: 6 (ref 4.6–8)
PROTEIN,URINE, POC: NEGATIVE
SPECIFIC GRAVITY, URINE, POC: 1.02 (ref 1–1.03)
URINALYSIS CLARITY, POC: NORMAL
URINALYSIS COLOR, POC: NORMAL
UROBILINOGEN, POC: NORMAL

## 2022-12-14 PROCEDURE — 3017F COLORECTAL CA SCREEN DOC REV: CPT | Performed by: UROLOGY

## 2022-12-14 PROCEDURE — 1123F ACP DISCUSS/DSCN MKR DOCD: CPT | Performed by: UROLOGY

## 2022-12-14 PROCEDURE — 99204 OFFICE O/P NEW MOD 45 MIN: CPT | Performed by: UROLOGY

## 2022-12-14 PROCEDURE — 4004F PT TOBACCO SCREEN RCVD TLK: CPT | Performed by: UROLOGY

## 2022-12-14 PROCEDURE — 81003 URINALYSIS AUTO W/O SCOPE: CPT | Performed by: UROLOGY

## 2022-12-14 PROCEDURE — G8421 BMI NOT CALCULATED: HCPCS | Performed by: UROLOGY

## 2022-12-14 PROCEDURE — G8484 FLU IMMUNIZE NO ADMIN: HCPCS | Performed by: UROLOGY

## 2022-12-14 PROCEDURE — G8428 CUR MEDS NOT DOCUMENT: HCPCS | Performed by: UROLOGY

## 2022-12-14 RX ORDER — MONTELUKAST SODIUM 10 MG/1
10 TABLET ORAL NIGHTLY
COMMUNITY

## 2022-12-14 ASSESSMENT — ENCOUNTER SYMPTOMS
RESPIRATORY NEGATIVE: 1
EYES NEGATIVE: 1

## 2022-12-14 NOTE — PROGRESS NOTES
[facility]  [address] [facility] [address]  [phone]    Kofi Steel  : 1955    Chief Complaint   Patient presents with    Urinary Tract Infection        HPI     Kofi Steel is a 79 y.o. male  last seen by me in 2018: who had severe LUTS. PSA 1.3 in . PSA 1.27 in , 1.72 in . Had Greenlight laser procedure 11. PSA 1.4 in -. PSA 1.3 in -17  Voiding great, good erections. Referred back by Dr. Shahid Boo because he had UTI on 2 occasions. Once was after a plane flight from [place] he had dysuria, rigors. He improved with abx. He had another similar episode with E coli on culture. He has good stream, no incontinence. , he states that his PSA was 1.4. 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     BPH (benign prostatic hypertrophy)     Chronic pain      Past Surgical History:   Procedure Laterality Date    COLONOSCOPY N/A 3/12/2019    COLONOSCOPY BMI 27 performed by Fiorella Ace MD at [address] [facility] Bilateral     inguinal    [facility], COMPLETE      [facility]    reduction left forearm fracture under anesthesia    ORTHOPEDIC SURGERY Right 10/18/2014    ankle ORIF    ORTHOPEDIC SURGERY Right 2015    Ankle    ORTHOPEDIC SURGERY Right 2016    ankle hardware removal    OTHER SURGICAL HISTORY  1966    surgery for wounds to left leg from dog bite    TURP       Current Outpatient Medications   Medication Sig Dispense Refill    montelukast (SINGULAIR) 10 MG tablet Take 10 mg by mouth nightly      Naproxen Sodium 220 MG CAPS Take by mouth as needed       No current facility-administered medications for this visit.      Allergies   Allergen Reactions    Septra [Sulfamethoxazole-Trimethoprim]      Social History     Socioeconomic History Marital status:      Spouse name: Not on file    Number of children: Not on file    Years of education: Not on file    Highest education level: Not on file   Occupational History    Not on file   Tobacco Use    Smoking status: Never    Smokeless tobacco: Never   Substance and Sexual Activity    Alcohol use: Yes     Alcohol/week: 3.0 standard drinks    Drug use: No     Types: OTC, Prescription    Sexual activity: Not on file   Other Topics Concern    Not on file   Social History Narrative    Not on file     Social Determinants of Health     Financial Resource Strain: Not on file   Food Insecurity: Not on file   Transportation Needs: Not on file   Physical Activity: Not on file   Stress: Not on file   Social Connections: Not on file   Intimate Partner Violence: Not on file   Housing Stability: Not on file     Family History   Problem Relation Age of Onset    Hypertension Father     Heart Disease Father     Diabetes Father     Heart Surgery Father     Hypertension Mother     Cancer Mother         lymphoma    Heart Disease Mother     High Cholesterol Mother     Dementia Mother        Review of Systems  Constitutional: Negative  Skin: Negative skin ROS  Eyes: Eyes negative  ENT: HENT negative  Respiratory: Respiratory negative  Cardiovascular: Neg cardio ROS  GI: Neg GI ROS  Genitourinary: Positive for urinary burning, recurrent UTIs, urgency and frequent urination. Musculoskeletal: Musculoskeletal negative  Neurological: Neg neuro ROS  Psychological: Neg psych ROS  Endocrine: Endocrine negative  Hem/Lymphatic: Hematologic/lymphatic negative    There were no vitals taken for this visit.     Urinalysis  UA - Dipstick  Results for orders placed or performed in visit on 12/14/22   AMB POC URINALYSIS DIP STICK AUTO W/O MICRO   Result Value Ref Range    Color (UA POC)      Clarity (UA POC)      Glucose, Urine, POC Negative Negative    Bilirubin, Urine, POC Negative Negative    KETONES, Urine, POC Negative Negative Specific Gravity, Urine, POC 1.020 1.001 - 1.035    Blood (UA POC) Negative Negative    pH, Urine, POC 6.0 4.6 - 8.0    Protein, Urine, POC Negative Negative    Urobilinogen, POC Normal     Nitrite, Urine, POC Negative Negative    Leukocyte Esterase, Urine, POC Negative Negative       UA - Micro  WBC - 0  RBC - 0  Bacteria - 0  Epith - 0    Physical Exam    Assessment and Plan    ICD-10-CM    1. Recurrent UTI  N39.0 AMB POC URINALYSIS DIP STICK AUTO W/O MICRO     US RETROPERITONEAL LIMITED          Orders Placed This Encounter   Procedures    US RETROPERITONEAL LIMITED           Standing Status:   Future     Standing Expiration Date:   12/14/2023     Order Specific Question:   Reason for exam:     Answer:   recurrent UTI    AMB POC URINALYSIS DIP STICK AUTO W/O MICRO     Recurrent febrile UTI. Will get renal US and cysto. Follow-up and Dispositions    Return in 26 days (on 1/9/2023) for PVR on return, cysto.

## 2023-01-09 ENCOUNTER — PROCEDURE VISIT (OUTPATIENT)
Dept: UROLOGY | Age: 68
End: 2023-01-09
Payer: MEDICARE

## 2023-01-09 DIAGNOSIS — N40.1 BPH WITH OBSTRUCTION/LOWER URINARY TRACT SYMPTOMS: ICD-10-CM

## 2023-01-09 DIAGNOSIS — N39.0 RECURRENT UTI: Primary | ICD-10-CM

## 2023-01-09 DIAGNOSIS — N13.8 BPH WITH OBSTRUCTION/LOWER URINARY TRACT SYMPTOMS: ICD-10-CM

## 2023-01-09 LAB
BILIRUBIN, URINE, POC: NEGATIVE
BLOOD URINE, POC: NEGATIVE
GLUCOSE URINE, POC: NEGATIVE
KETONES, URINE, POC: NEGATIVE
LEUKOCYTE ESTERASE, URINE, POC: NEGATIVE
NITRITE, URINE, POC: NEGATIVE
PH, URINE, POC: 5.5 (ref 4.6–8)
PROTEIN,URINE, POC: NEGATIVE
PVR, POC: 19 CC
SPECIFIC GRAVITY, URINE, POC: 1.02 (ref 1–1.03)
URINALYSIS CLARITY, POC: NORMAL
URINALYSIS COLOR, POC: NORMAL
UROBILINOGEN, POC: NORMAL

## 2023-01-09 PROCEDURE — 52000 CYSTOURETHROSCOPY: CPT | Performed by: UROLOGY

## 2023-01-09 PROCEDURE — 81003 URINALYSIS AUTO W/O SCOPE: CPT | Performed by: UROLOGY

## 2023-01-09 PROCEDURE — 51798 US URINE CAPACITY MEASURE: CPT | Performed by: UROLOGY

## 2023-01-09 NOTE — PROGRESS NOTES
[facility]  [address]   [address]  [facility], [address] [facility]  [phone]    Mahad Clinton  : 1955         HPI   79 y.o., male  last seen by me in 2018: who had severe LUTS. PSA 1.3 in . PSA 1.27 in , 1.72 in . Had Greenlight laser procedure 11. PSA 1.4 in -. PSA 1.3 in -17  Voiding great, good erections. Referred back by Dr. Sb Restrepo because he had UTI on 2 occasions. Once was after a plane flight from [place] he had dysuria, rigors. He improved with abx. He had another similar episode with E coli on culture. He has good stream, no incontinence. , he states that his PSA was 1.4. Here for PVR/cysto. Past Medical History:   Diagnosis Date    Anemia     Ankle fracture, right 10/18/2014    Arrhythmia     unifocal pvc- followed by Dr. Quang Cosme     BPH (benign prostatic hypertrophy)     Chronic pain      Past Surgical History:   Procedure Laterality Date    COLONOSCOPY N/A 3/12/2019    COLONOSCOPY BMI 27 performed by Xavi Lopez MD at [facility] Bilateral     inguinal    1 [facility], COMPLETE      [facility]    reduction left forearm fracture under anesthesia    ORTHOPEDIC SURGERY Right 10/18/2014    ankle ORIF    ORTHOPEDIC SURGERY Right 2015    Ankle    ORTHOPEDIC SURGERY Right 2016    ankle hardware removal    OTHER SURGICAL HISTORY  1966    surgery for wounds to left leg from dog bite    TURP       Current Outpatient Medications   Medication Sig Dispense Refill    montelukast (SINGULAIR) 10 MG tablet Take 10 mg by mouth nightly      Naproxen Sodium 220 MG CAPS Take by mouth as needed       No current facility-administered medications for this visit.      Allergies   Allergen Reactions    Septra [Sulfamethoxazole-Trimethoprim]      Social History     Socioeconomic History    Marital status:      Spouse name: Not on file    Number of children: Not on file    Years of education: Not on file    Highest education level: Not on file   Occupational History    Not on file   Tobacco Use    Smoking status: Never    Smokeless tobacco: Never   Substance and Sexual Activity    Alcohol use: Yes     Alcohol/week: 3.0 standard drinks    Drug use: No     Types: OTC, Prescription    Sexual activity: Not on file   Other Topics Concern    Not on file   Social History Narrative    Not on file     Social Determinants of Health     Financial Resource Strain: Not on file   Food Insecurity: Not on file   Transportation Needs: Not on file   Physical Activity: Not on file   Stress: Not on file   Social Connections: Not on file   Intimate Partner Violence: Not on file   Housing Stability: Not on file     Family History   Problem Relation Age of Onset    Hypertension Father     Heart Disease Father     Diabetes Father     Heart Surgery Father     Hypertension Mother     Cancer Mother         lymphoma    Heart Disease Mother     High Cholesterol Mother     Dementia Mother        There were no vitals taken for this visit.     UA - Dipstick  Results for orders placed or performed in visit on 01/09/23   AMB POC URINALYSIS DIP STICK AUTO W/O MICRO   Result Value Ref Range    Color (UA POC)      Clarity (UA POC)      Glucose, Urine, POC Negative Negative    Bilirubin, Urine, POC Negative Negative    KETONES, Urine, POC Negative Negative    Specific Gravity, Urine, POC 1.025 1.001 - 1.035    Blood (UA POC) Negative Negative    pH, Urine, POC 5.5 4.6 - 8.0    Protein, Urine, POC Negative Negative    Urobilinogen, POC 0.2 mg/dL     Nitrite, Urine, POC Negative Negative    Leukocyte Esterase, Urine, POC Negative Negative       UA - Micro  WBC - 0  RBC - 0  Bacteria - 0  Epith - 0          Cystoscopy Procedure:     Procedure Room # 3  Scope ID: [id number]  Assistant: travis  post void residual  19ml  Cystoscopy Procedure Note    Procedure Details   The risks, benefits, complications, treatment options, and expected outcomes were discussed with the patient. The patient concurred with the proposed plan, giving informed consent. Cystoscopy was performed today under local anesthesia, using sterile technique. The patient was placed in the lithotomy position, prepped with Betadine, and draped in the usual sterile fashion. A  flexiblecystoscope was used to inspect both the urethra and bladder using the 30 and 70 degree lenses. Findings:  Anterior urethra: normal without strictures. Prostatic urethra:  s/p TURP, mostly open, small BPH nodule posterior fossa creating partial obstruction. , without bladder neck contracture. Bladder: Normal mucosa, without lesions. Ureteral orifice(s) was/were seen bilateral;                             Complications:  None; patient tolerated the procedure well. Condition: stable  PVRE 19 ml    Assessment and Plan    ICD-10-CM    1. Recurrent UTI  N39.0 CYSTOURETHROSCOPY     AMB POC URINALYSIS DIP STICK AUTO W/O MICRO     AMB POC PVR, SATNAM,POST-VOID RES,US,NON-IMAGING      2. BPH with obstruction/lower urinary tract symptoms  N40.1     N13.8         He will schedule the MARTHA. Call patient with results  Return if symptoms worsen or fail to improve.

## 2023-02-22 ENCOUNTER — APPOINTMENT (RX ONLY)
Dept: URBAN - METROPOLITAN AREA CLINIC 23 | Facility: CLINIC | Age: 68
Setting detail: DERMATOLOGY
End: 2023-02-22

## 2023-02-22 DIAGNOSIS — D18.0 HEMANGIOMA: ICD-10-CM

## 2023-02-22 DIAGNOSIS — D22 MELANOCYTIC NEVI: ICD-10-CM

## 2023-02-22 DIAGNOSIS — R20.2 PARESTHESIA OF SKIN: ICD-10-CM

## 2023-02-22 DIAGNOSIS — B35.1 TINEA UNGUIUM: ICD-10-CM

## 2023-02-22 DIAGNOSIS — L57.8 OTHER SKIN CHANGES DUE TO CHRONIC EXPOSURE TO NONIONIZING RADIATION: ICD-10-CM

## 2023-02-22 DIAGNOSIS — L82.0 INFLAMED SEBORRHEIC KERATOSIS: ICD-10-CM

## 2023-02-22 DIAGNOSIS — L82.1 OTHER SEBORRHEIC KERATOSIS: ICD-10-CM

## 2023-02-22 PROBLEM — D22.5 MELANOCYTIC NEVI OF TRUNK: Status: ACTIVE | Noted: 2023-02-22

## 2023-02-22 PROBLEM — D18.01 HEMANGIOMA OF SKIN AND SUBCUTANEOUS TISSUE: Status: ACTIVE | Noted: 2023-02-22

## 2023-02-22 PROCEDURE — 99213 OFFICE O/P EST LOW 20 MIN: CPT | Mod: 25

## 2023-02-22 PROCEDURE — ? FOLLOW UP FOR NEXT VISIT

## 2023-02-22 PROCEDURE — ? LIQUID NITROGEN

## 2023-02-22 PROCEDURE — ? COUNSELING

## 2023-02-22 PROCEDURE — ? MEDICAL PHOTOGRAPHY REVIEW

## 2023-02-22 PROCEDURE — 17111 DESTRUCTION B9 LESIONS 15/>: CPT

## 2023-02-22 ASSESSMENT — LOCATION SIMPLE DESCRIPTION DERM
LOCATION SIMPLE: RIGHT GREAT TOE
LOCATION SIMPLE: LEFT GREAT TOE
LOCATION SIMPLE: RIGHT EYEBROW
LOCATION SIMPLE: LEFT CHEEK
LOCATION SIMPLE: RIGHT SUPERIOR EYELID
LOCATION SIMPLE: LEFT 4TH TOE
LOCATION SIMPLE: RIGHT CLAVICULAR SKIN
LOCATION SIMPLE: RIGHT FOREHEAD
LOCATION SIMPLE: LEFT SHOULDER
LOCATION SIMPLE: LEFT UPPER BACK
LOCATION SIMPLE: RIGHT 3RD TOE
LOCATION SIMPLE: RIGHT 5TH TOE
LOCATION SIMPLE: LEFT 3RD TOE
LOCATION SIMPLE: RIGHT SHOULDER
LOCATION SIMPLE: LEFT 2ND TOE
LOCATION SIMPLE: UPPER BACK
LOCATION SIMPLE: RIGHT ANTERIOR NECK
LOCATION SIMPLE: RIGHT 4TH TOE
LOCATION SIMPLE: LEFT 5TH TOE
LOCATION SIMPLE: RIGHT UPPER BACK
LOCATION SIMPLE: RIGHT CHEEK
LOCATION SIMPLE: CHEST
LOCATION SIMPLE: LEFT ANTERIOR NECK
LOCATION SIMPLE: ABDOMEN
LOCATION SIMPLE: LEFT FOREHEAD
LOCATION SIMPLE: RIGHT 2ND TOE

## 2023-02-22 ASSESSMENT — LOCATION DETAILED DESCRIPTION DERM
LOCATION DETAILED: LEFT SUPERIOR MEDIAL MALAR CHEEK
LOCATION DETAILED: LEFT 2ND TOENAIL
LOCATION DETAILED: LEFT ANTERIOR SHOULDER
LOCATION DETAILED: LEFT INFERIOR CENTRAL MALAR CHEEK
LOCATION DETAILED: EPIGASTRIC SKIN
LOCATION DETAILED: RIGHT LATERAL FOREHEAD
LOCATION DETAILED: RIGHT MEDIAL SUPERIOR CHEST
LOCATION DETAILED: LEFT SUPERIOR ANTERIOR NECK
LOCATION DETAILED: RIGHT 4TH TOENAIL
LOCATION DETAILED: RIGHT LATERAL MANDIBULAR CHEEK
LOCATION DETAILED: RIGHT SUPERIOR UPPER BACK
LOCATION DETAILED: LEFT INFERIOR LATERAL UPPER BACK
LOCATION DETAILED: RIGHT CLAVICULAR SKIN
LOCATION DETAILED: LEFT 5TH TOENAIL
LOCATION DETAILED: RIGHT GREAT TOENAIL
LOCATION DETAILED: LEFT GREAT TOENAIL
LOCATION DETAILED: RIGHT SUPERIOR MEDIAL MALAR CHEEK
LOCATION DETAILED: LEFT 3RD TOENAIL
LOCATION DETAILED: SUPERIOR THORACIC SPINE
LOCATION DETAILED: INFERIOR THORACIC SPINE
LOCATION DETAILED: LEFT SUPERIOR MEDIAL UPPER BACK
LOCATION DETAILED: RIGHT MID-UPPER BACK
LOCATION DETAILED: LEFT POSTERIOR SHOULDER
LOCATION DETAILED: RIGHT POSTERIOR SHOULDER
LOCATION DETAILED: RIGHT CLAVICULAR NECK
LOCATION DETAILED: LEFT MEDIAL SUPERIOR CHEST
LOCATION DETAILED: LEFT FOREHEAD
LOCATION DETAILED: LEFT SUPERIOR FOREHEAD
LOCATION DETAILED: RIGHT LATERAL UPPER BACK
LOCATION DETAILED: RIGHT 2ND TOENAIL
LOCATION DETAILED: RIGHT 5TH TOENAIL
LOCATION DETAILED: RIGHT 3RD TOENAIL
LOCATION DETAILED: RIGHT SUPERIOR LATERAL UPPER BACK
LOCATION DETAILED: LEFT CLAVICULAR NECK
LOCATION DETAILED: LEFT 4TH TOENAIL
LOCATION DETAILED: LEFT SUPERIOR UPPER BACK
LOCATION DETAILED: RIGHT LATERAL SUPERIOR EYELID
LOCATION DETAILED: LEFT LATERAL UPPER BACK
LOCATION DETAILED: RIGHT CENTRAL EYEBROW
LOCATION DETAILED: LEFT SUPERIOR CENTRAL MALAR CHEEK
LOCATION DETAILED: LEFT SUPERIOR MEDIAL FOREHEAD

## 2023-02-22 ASSESSMENT — LOCATION ZONE DERM
LOCATION ZONE: FACE
LOCATION ZONE: ARM
LOCATION ZONE: EYELID
LOCATION ZONE: TOENAIL
LOCATION ZONE: TRUNK
LOCATION ZONE: NECK

## 2023-08-23 ENCOUNTER — APPOINTMENT (RX ONLY)
Dept: URBAN - METROPOLITAN AREA CLINIC 23 | Facility: CLINIC | Age: 68
Setting detail: DERMATOLOGY
End: 2023-08-23

## 2023-08-23 DIAGNOSIS — L82.0 INFLAMED SEBORRHEIC KERATOSIS: ICD-10-CM

## 2023-08-23 DIAGNOSIS — D18.0 HEMANGIOMA: ICD-10-CM

## 2023-08-23 DIAGNOSIS — L81.4 OTHER MELANIN HYPERPIGMENTATION: ICD-10-CM

## 2023-08-23 DIAGNOSIS — D22 MELANOCYTIC NEVI: ICD-10-CM

## 2023-08-23 DIAGNOSIS — R20.2 PARESTHESIA OF SKIN: ICD-10-CM

## 2023-08-23 DIAGNOSIS — L57.8 OTHER SKIN CHANGES DUE TO CHRONIC EXPOSURE TO NONIONIZING RADIATION: ICD-10-CM

## 2023-08-23 PROBLEM — D18.01 HEMANGIOMA OF SKIN AND SUBCUTANEOUS TISSUE: Status: ACTIVE | Noted: 2023-08-23

## 2023-08-23 PROBLEM — D22.5 MELANOCYTIC NEVI OF TRUNK: Status: ACTIVE | Noted: 2023-08-23

## 2023-08-23 PROCEDURE — 17111 DESTRUCTION B9 LESIONS 15/>: CPT

## 2023-08-23 PROCEDURE — ? LIQUID NITROGEN

## 2023-08-23 PROCEDURE — ? COUNSELING

## 2023-08-23 PROCEDURE — ? MEDICAL PHOTOGRAPHY REVIEW

## 2023-08-23 PROCEDURE — 99213 OFFICE O/P EST LOW 20 MIN: CPT | Mod: 25

## 2023-08-23 ASSESSMENT — LOCATION SIMPLE DESCRIPTION DERM
LOCATION SIMPLE: LEFT UPPER BACK
LOCATION SIMPLE: POSTERIOR NECK
LOCATION SIMPLE: UPPER BACK
LOCATION SIMPLE: RIGHT UPPER BACK
LOCATION SIMPLE: LEFT CHEEK
LOCATION SIMPLE: CHEST
LOCATION SIMPLE: ABDOMEN
LOCATION SIMPLE: LEFT SHOULDER
LOCATION SIMPLE: RIGHT SHOULDER

## 2023-08-23 ASSESSMENT — LOCATION DETAILED DESCRIPTION DERM
LOCATION DETAILED: LEFT POSTERIOR SHOULDER
LOCATION DETAILED: RIGHT MID-UPPER BACK
LOCATION DETAILED: LEFT SUPERIOR UPPER BACK
LOCATION DETAILED: RIGHT SUPERIOR UPPER BACK
LOCATION DETAILED: LEFT MID-UPPER BACK
LOCATION DETAILED: LEFT MEDIAL SUPERIOR CHEST
LOCATION DETAILED: LEFT LATERAL UPPER BACK
LOCATION DETAILED: LEFT MEDIAL UPPER BACK
LOCATION DETAILED: EPIGASTRIC SKIN
LOCATION DETAILED: SUPERIOR THORACIC SPINE
LOCATION DETAILED: LEFT CENTRAL MALAR CHEEK
LOCATION DETAILED: RIGHT LATERAL TRAPEZIAL NECK
LOCATION DETAILED: RIGHT POSTERIOR SHOULDER
LOCATION DETAILED: LEFT LATERAL TRAPEZIAL NECK

## 2023-08-23 ASSESSMENT — LOCATION ZONE DERM
LOCATION ZONE: ARM
LOCATION ZONE: FACE
LOCATION ZONE: TRUNK
LOCATION ZONE: NECK

## 2024-04-24 ENCOUNTER — APPOINTMENT (RX ONLY)
Dept: URBAN - METROPOLITAN AREA CLINIC 23 | Facility: CLINIC | Age: 69
Setting detail: DERMATOLOGY
End: 2024-04-24

## 2024-04-24 DIAGNOSIS — D18.0 HEMANGIOMA: ICD-10-CM

## 2024-04-24 DIAGNOSIS — L82.0 INFLAMED SEBORRHEIC KERATOSIS: ICD-10-CM

## 2024-04-24 DIAGNOSIS — D22 MELANOCYTIC NEVI: ICD-10-CM

## 2024-04-24 DIAGNOSIS — L81.4 OTHER MELANIN HYPERPIGMENTATION: ICD-10-CM

## 2024-04-24 DIAGNOSIS — B35.3 TINEA PEDIS: ICD-10-CM

## 2024-04-24 DIAGNOSIS — L57.8 OTHER SKIN CHANGES DUE TO CHRONIC EXPOSURE TO NONIONIZING RADIATION: ICD-10-CM

## 2024-04-24 DIAGNOSIS — R20.2 PARESTHESIA OF SKIN: ICD-10-CM

## 2024-04-24 DIAGNOSIS — L20.89 OTHER ATOPIC DERMATITIS: ICD-10-CM

## 2024-04-24 PROBLEM — D18.01 HEMANGIOMA OF SKIN AND SUBCUTANEOUS TISSUE: Status: ACTIVE | Noted: 2024-04-24

## 2024-04-24 PROBLEM — D48.5 NEOPLASM OF UNCERTAIN BEHAVIOR OF SKIN: Status: ACTIVE | Noted: 2024-04-24

## 2024-04-24 PROBLEM — D22.5 MELANOCYTIC NEVI OF TRUNK: Status: ACTIVE | Noted: 2024-04-24

## 2024-04-24 PROCEDURE — ? COUNSELING

## 2024-04-24 PROCEDURE — 17111 DESTRUCTION B9 LESIONS 15/>: CPT

## 2024-04-24 PROCEDURE — ? MEDICAL PHOTOGRAPHY REVIEW

## 2024-04-24 PROCEDURE — ? TREATMENT REGIMEN

## 2024-04-24 PROCEDURE — ? LIQUID NITROGEN

## 2024-04-24 PROCEDURE — ? BIOPSY BY SHAVE METHOD

## 2024-04-24 PROCEDURE — ? OTC TREATMENT REGIMEN

## 2024-04-24 PROCEDURE — 11102 TANGNTL BX SKIN SINGLE LES: CPT | Mod: 59

## 2024-04-24 PROCEDURE — 99213 OFFICE O/P EST LOW 20 MIN: CPT | Mod: 25

## 2024-04-24 ASSESSMENT — LOCATION DETAILED DESCRIPTION DERM
LOCATION DETAILED: RIGHT RADIAL DORSAL HAND
LOCATION DETAILED: RIGHT INFERIOR CENTRAL MALAR CHEEK
LOCATION DETAILED: LEFT SUPERIOR FOREHEAD
LOCATION DETAILED: RIGHT SUPERIOR FOREHEAD
LOCATION DETAILED: LEFT CLAVICULAR SKIN
LOCATION DETAILED: LEFT INFERIOR LATERAL FOREHEAD
LOCATION DETAILED: RIGHT MEDIAL FOREHEAD
LOCATION DETAILED: RIGHT LATERAL FOREHEAD
LOCATION DETAILED: SUPERIOR THORACIC SPINE
LOCATION DETAILED: RIGHT SUPERIOR LATERAL NECK
LOCATION DETAILED: LEFT INSTEP
LOCATION DETAILED: RIGHT INFERIOR FOREHEAD
LOCATION DETAILED: RIGHT CENTRAL TEMPLE
LOCATION DETAILED: RIGHT INFERIOR LATERAL FOREHEAD
LOCATION DETAILED: LEFT ULNAR DORSAL HAND
LOCATION DETAILED: RIGHT INSTEP
LOCATION DETAILED: LEFT PROXIMAL CALF
LOCATION DETAILED: LEFT CENTRAL MALAR CHEEK
LOCATION DETAILED: RIGHT PROXIMAL CALF
LOCATION DETAILED: RIGHT FOREHEAD
LOCATION DETAILED: RIGHT RIB CAGE
LOCATION DETAILED: LEFT SUPERIOR MEDIAL MALAR CHEEK
LOCATION DETAILED: RIGHT CLAVICULAR NECK
LOCATION DETAILED: EPIGASTRIC SKIN
LOCATION DETAILED: LEFT CLAVICULAR NECK
LOCATION DETAILED: LEFT MEDIAL SUPERIOR CHEST
LOCATION DETAILED: RIGHT INFERIOR LATERAL NECK
LOCATION DETAILED: RIGHT SUPERIOR LATERAL MALAR CHEEK
LOCATION DETAILED: LEFT LATERAL FOREHEAD
LOCATION DETAILED: LEFT ANTERIOR LATERAL PROXIMAL UPPER ARM
LOCATION DETAILED: LEFT FOREHEAD
LOCATION DETAILED: RIGHT ANTERIOR AXILLA

## 2024-04-24 ASSESSMENT — LOCATION ZONE DERM
LOCATION ZONE: ARM
LOCATION ZONE: NECK
LOCATION ZONE: HAND
LOCATION ZONE: AXILLAE
LOCATION ZONE: FEET
LOCATION ZONE: LEG
LOCATION ZONE: FACE
LOCATION ZONE: TRUNK

## 2024-04-24 ASSESSMENT — LOCATION SIMPLE DESCRIPTION DERM
LOCATION SIMPLE: RIGHT HAND
LOCATION SIMPLE: LEFT PLANTAR SURFACE
LOCATION SIMPLE: LEFT CHEEK
LOCATION SIMPLE: LEFT CLAVICULAR SKIN
LOCATION SIMPLE: RIGHT CALF
LOCATION SIMPLE: ABDOMEN
LOCATION SIMPLE: LEFT ANTERIOR NECK
LOCATION SIMPLE: CHEST
LOCATION SIMPLE: RIGHT FOREHEAD
LOCATION SIMPLE: RIGHT TEMPLE
LOCATION SIMPLE: LEFT FOREHEAD
LOCATION SIMPLE: LEFT HAND
LOCATION SIMPLE: RIGHT ANTERIOR NECK
LOCATION SIMPLE: LEFT UPPER ARM
LOCATION SIMPLE: LEFT CALF
LOCATION SIMPLE: UPPER BACK
LOCATION SIMPLE: RIGHT PLANTAR SURFACE
LOCATION SIMPLE: RIGHT CHEEK
LOCATION SIMPLE: RIGHT AXILLA

## 2024-04-24 ASSESSMENT — SEVERITY ASSESSMENT 2020: SEVERITY 2020: MILD

## 2024-04-24 ASSESSMENT — SEVERITY ASSESSMENT: SEVERITY: MILD

## 2024-07-17 ENCOUNTER — APPOINTMENT (RX ONLY)
Dept: URBAN - METROPOLITAN AREA CLINIC 23 | Facility: CLINIC | Age: 69
Setting detail: DERMATOLOGY
End: 2024-07-17

## 2024-07-17 PROBLEM — C44.41 BASAL CELL CARCINOMA OF SKIN OF SCALP AND NECK: Status: ACTIVE | Noted: 2024-07-17

## 2024-07-17 PROCEDURE — 11622 EXC S/N/H/F/G MAL+MRG 1.1-2: CPT

## 2024-07-17 PROCEDURE — ? EXCISION

## 2024-07-17 PROCEDURE — 12042 INTMD RPR N-HF/GENIT2.6-7.5: CPT

## (undated) DEVICE — SYR 3ML LL TIP 1/10ML GRAD --

## (undated) DEVICE — SYR 5ML 1/5 GRAD LL NSAF LF --

## (undated) DEVICE — KENDALL RADIOLUCENT FOAM MONITORING ELECTRODE RECTANGULAR SHAPE: Brand: KENDALL

## (undated) DEVICE — NDL PRT INJ NSAF BLNT 18GX1.5 --

## (undated) DEVICE — CONNECTOR TBNG OD5-7MM O2 END DISP

## (undated) DEVICE — CONTAINER PREFIL FRMLN 40ML --

## (undated) DEVICE — CANNULA NSL ORAL AD FOR CAPNOFLEX CO2 O2 AIRLFE

## (undated) DEVICE — FORCEPS BX L240CM JAW DIA2.8MM L CAP W/ NDL MIC MESH TOOTH